# Patient Record
Sex: MALE | Race: WHITE
[De-identification: names, ages, dates, MRNs, and addresses within clinical notes are randomized per-mention and may not be internally consistent; named-entity substitution may affect disease eponyms.]

---

## 2018-11-01 ENCOUNTER — HOSPITAL ENCOUNTER (EMERGENCY)
Dept: HOSPITAL 80 - FED | Age: 38
Discharge: HOME | End: 2018-11-01
Payer: COMMERCIAL

## 2018-11-01 VITALS — SYSTOLIC BLOOD PRESSURE: 101 MMHG | DIASTOLIC BLOOD PRESSURE: 79 MMHG

## 2018-11-01 DIAGNOSIS — E86.9: ICD-10-CM

## 2018-11-01 DIAGNOSIS — R55: Primary | ICD-10-CM

## 2018-11-01 LAB — PLATELET # BLD: 292 10^3/UL (ref 150–400)

## 2018-11-01 NOTE — CPEKG
Test Reason : OPEN

Blood Pressure : ***/*** mmHG

Vent. Rate : 074 BPM     Atrial Rate : 074 BPM

   P-R Int : 205 ms          QRS Dur : 090 ms

    QT Int : 383 ms       P-R-T Axes : 030 055 068 degrees

   QTc Int : 425 ms

 

Sinus rhythm

Borderline prolonged VA interval

 

Confirmed by Jorge Andrea ([id number]) on 11/1/2018 5:06:34 PM

 

Referred By:             Confirmed By:Jorge Andrea